# Patient Record
Sex: MALE | Race: WHITE
[De-identification: names, ages, dates, MRNs, and addresses within clinical notes are randomized per-mention and may not be internally consistent; named-entity substitution may affect disease eponyms.]

---

## 2020-09-10 ENCOUNTER — HOSPITAL ENCOUNTER (EMERGENCY)
Dept: HOSPITAL 75 - ER | Age: 65
End: 2020-09-10
Payer: MEDICARE

## 2020-09-10 VITALS — WEIGHT: 160.06 LBS | HEIGHT: 68.98 IN | BODY MASS INDEX: 23.71 KG/M2

## 2020-09-10 VITALS — DIASTOLIC BLOOD PRESSURE: 47 MMHG | SYSTOLIC BLOOD PRESSURE: 110 MMHG

## 2020-09-10 DIAGNOSIS — Z88.0: ICD-10-CM

## 2020-09-10 DIAGNOSIS — Z20.828: ICD-10-CM

## 2020-09-10 DIAGNOSIS — Z88.7: ICD-10-CM

## 2020-09-10 DIAGNOSIS — I46.9: Primary | ICD-10-CM

## 2020-09-10 DIAGNOSIS — I49.01: ICD-10-CM

## 2020-09-10 DIAGNOSIS — F17.200: ICD-10-CM

## 2020-09-10 DIAGNOSIS — Z79.52: ICD-10-CM

## 2020-09-10 PROCEDURE — 99283 EMERGENCY DEPT VISIT LOW MDM: CPT

## 2020-09-10 PROCEDURE — 36415 COLL VENOUS BLD VENIPUNCTURE: CPT

## 2020-09-10 PROCEDURE — 80307 DRUG TEST PRSMV CHEM ANLYZR: CPT

## 2020-09-10 PROCEDURE — 31500 INSERT EMERGENCY AIRWAY: CPT

## 2020-09-10 PROCEDURE — 87635 SARS-COV-2 COVID-19 AMP PRB: CPT

## 2020-09-10 NOTE — NUR
1213 TOA TO ROOM, CPR IN PROGRESS, FSBS 330, EMS REPORTS NO PMH.

1214 PULSE CHECK, ASYSTOLE, CONT CPR

1215 1MG EPI IV

1216 300 MG AMIODORONE

1217 NO PULSE

1218 1 MG EPI IV

1219 INTUBATION WITH COLOR CHANGE PER DR YAO 29 @ THE TEETH

1221 1 MG EPI IV AND 16 FR OGT

1223 50 MEQ BICARG IV, PULSE CHECK REMAINS ASYSTOLE, CPR CONT.

1225 1 MG EPI IV, ETCO2 28, 76%, 94/31 BP WITH CPR.

1226 ETCO2 18, PULSE CHECK, REMAINS IN ASYSTOLE, DOCTOR CALLED TOD @1226.

1241 COVID TEST COLLECTED AND SENT TO LAB.

1253 PASTORAL CARE ARRIVED TO SPEAK WITH FAMILY.

## 2020-09-10 NOTE — NUR
PT WIFE REPORTS PT DID NOT GO TO DOCTOR, PT C/O RACING HEART TODAY. FAMILY 
REPORTS HEAVY DRINKER AND HEAVY SMOKER.
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
## 2020-09-10 NOTE — NUR
Received phone call from [facility], ok to release body to  home.  Jill 
notified of release of body. She will call family and discuss further plans.